# Patient Record
(demographics unavailable — no encounter records)

---

## 2024-12-28 NOTE — PROCEDURE
[FreeTextEntry1] :  Xray Chest 2 Views PA/Lat             Final  No Documents Attached    	 Chest x-ray PA and lateral views performed in my office today showed clear lungs, no evidence of infiltrates or pleural effusions.  Ordered by: DANI BHAGAT       Collected/Examined: [id number] 11:31AM       Verification Required       Stage: Final       Performed at: In Office       Performed by: MISSAEL AL       Resulted: [id number] 11:31AM       Last Updated: [id number] 11:32AM

## 2024-12-28 NOTE — HISTORY OF PRESENT ILLNESS
[Never] : never [TextBox_4] : Mr. SANTIAGO STORY is a 20-year-old male who presents here today as an acute visit. S/S with 101.8 fever started [place] chantel. [facility] given Tamiflu and albuterol which he has used bid. He denies any wheezing, no further fever.  Biggest c/o pains in chest with cough sometimes sharp, some tightness.  Some cough with colored mucus, not a lot of mucus. Some cough at night with sleep.

## 2024-12-28 NOTE — PLAN
[FreeTextEntry1] : Continue Tamiflu as prescribed by UC.  Start Prednisone 30mg x3 days, 20mg x3 days, then 10mg x3 days for productive cough//asthma exacerbation. Return in 1 month for annual wellness evaluation.

## 2024-12-28 NOTE — ASSESSMENT
[FreeTextEntry1] : Cough and shortness of breath secondary to asthma exacerbation from influenza infection.

## 2024-12-28 NOTE — ADDENDUM
[FreeTextEntry1] : This note was written by Sharon Saeed on 12/27/2024 acting as medical scribe for Dr. Lindsay Altamirano. I, Dr. Lindsay Altamirano, have read and attest that all the information, medical decision making and discharge instructions within are true and accurate.

## 2024-12-28 NOTE — PROCEDURE
[FreeTextEntry1] :  Xray Chest 2 Views PA/Lat             Final  No Documents Attached    	 Chest x-ray PA and lateral views performed in my office today showed clear lungs, no evidence of infiltrates or pleural effusions.  Ordered by: DANI BHAGAT       Collected/Examined: [id number] 11:31AM       Verification Required       Stage: Final       Performed at: In Office       Performed by: MISSAEL AL       Resulted: [id number] 11:31AM       Last Updated: [id number] 11:32AM